# Patient Record
Sex: MALE | Race: WHITE | NOT HISPANIC OR LATINO | Employment: PART TIME | URBAN - METROPOLITAN AREA
[De-identification: names, ages, dates, MRNs, and addresses within clinical notes are randomized per-mention and may not be internally consistent; named-entity substitution may affect disease eponyms.]

---

## 2019-05-09 ENCOUNTER — HOSPITAL ENCOUNTER (EMERGENCY)
Facility: HOSPITAL | Age: 29
Discharge: HOME | End: 2019-05-09
Attending: EMERGENCY MEDICINE

## 2019-05-09 VITALS
HEART RATE: 89 BPM | TEMPERATURE: 98 F | DIASTOLIC BLOOD PRESSURE: 80 MMHG | WEIGHT: 170 LBS | HEIGHT: 69 IN | BODY MASS INDEX: 25.18 KG/M2 | OXYGEN SATURATION: 100 % | RESPIRATION RATE: 16 BRPM | SYSTOLIC BLOOD PRESSURE: 155 MMHG

## 2019-05-09 DIAGNOSIS — G51.0 BELL'S PALSY: Primary | ICD-10-CM

## 2019-05-09 PROCEDURE — 25000003 PHARM REV CODE 250: Performed by: EMERGENCY MEDICINE

## 2019-05-09 PROCEDURE — 99284 EMERGENCY DEPT VISIT MOD MDM: CPT | Mod: 25

## 2019-05-09 RX ORDER — BUTALBITAL, ACETAMINOPHEN AND CAFFEINE 50; 325; 40 MG/1; MG/1; MG/1
1 TABLET ORAL
Status: COMPLETED | OUTPATIENT
Start: 2019-05-09 | End: 2019-05-09

## 2019-05-09 RX ORDER — PREDNISONE 20 MG/1
20 TABLET ORAL 2 TIMES DAILY
Qty: 10 TABLET | Refills: 0 | Status: SHIPPED | OUTPATIENT
Start: 2019-05-09 | End: 2019-05-14

## 2019-05-09 RX ORDER — ACETAMINOPHEN 325 MG/1
325 TABLET ORAL
Status: COMPLETED | OUTPATIENT
Start: 2019-05-09 | End: 2019-05-09

## 2019-05-09 RX ORDER — BUTALBITAL, ACETAMINOPHEN AND CAFFEINE 50; 325; 40 MG/1; MG/1; MG/1
1 TABLET ORAL EVERY 4 HOURS PRN
Qty: 15 TABLET | Refills: 0 | Status: SHIPPED | OUTPATIENT
Start: 2019-05-09 | End: 2019-06-08

## 2019-05-09 RX ADMIN — ACETAMINOPHEN 325 MG: 325 TABLET ORAL at 08:05

## 2019-05-09 RX ADMIN — BUTALBITAL, ACETAMINOPHEN AND CAFFEINE 1 TABLET: 50; 325; 40 TABLET ORAL at 08:05

## 2019-05-09 NOTE — ED PROVIDER NOTES
"Encounter Date: 5/9/2019    SCRIBE #1 NOTE: I, Tana Romo, am scribing for, and in the presence of, Dr. Rickie Wakefield.       History     Chief Complaint   Patient presents with    Facial Droop     on L x 1 year.       Time seen by provider: 8:02 AM on 05/09/2019    Lamin Munoz is a 28 y.o. male who presents the ED with complaints of left facial droop and left ear pain. The patient notes that his symptoms started since he had his wisdom teeth removed x14 months ago. He started using ear drops for his ear aches a couple days ago but "it made the pain 15 times worse". He has not been able to sleep properly since then. The patient mentions having left sided headaches secondary to his ear pain. No PMHx noted. No SHx noted. NKDA noted.     The history is provided by the patient.     Review of patient's allergies indicates:  No Known Allergies  History reviewed. No pertinent past medical history.  History reviewed. No pertinent surgical history.  History reviewed. No pertinent family history.  Social History     Tobacco Use    Smoking status: Former Smoker   Substance Use Topics    Alcohol use: Not on file    Drug use: Not on file     Review of Systems   Constitutional: Negative for activity change, appetite change, chills, fatigue and fever.   HENT: Positive for ear pain (left). Negative for congestion and rhinorrhea.    Respiratory: Negative for shortness of breath.    Cardiovascular: Negative for chest pain.   Gastrointestinal: Negative for abdominal pain.   Musculoskeletal: Negative for neck pain.   Skin: Negative for pallor and rash.   Neurological: Positive for facial asymmetry (left side facial droop) and headaches (left sided). Negative for weakness.   Hematological: Does not bruise/bleed easily.   Psychiatric/Behavioral: Negative for agitation.       Physical Exam     Initial Vitals [05/09/19 0757]   BP Pulse Resp Temp SpO2   (!) 172/106 66 12 98.2 °F (36.8 °C) 100 %      MAP       --         Physical " Exam    Nursing note and vitals reviewed.  Constitutional: He appears well-developed and well-nourished.   HENT:   Head: Normocephalic and atraumatic.   Right Ear: A middle ear effusion is present.   Left Ear: A middle ear effusion is present.   Mouth/Throat: Uvula is midline, oropharynx is clear and moist and mucous membranes are normal.   7th cranial nerve palsy noted. No gingival swelling noted.   Eyes: Conjunctivae are normal.   Neck: Normal range of motion. Neck supple.   No cervical adenopathy.   Cardiovascular: Normal rate, regular rhythm and normal heart sounds. Exam reveals no gallop and no friction rub.    No murmur heard.  Pulmonary/Chest: Breath sounds normal. No respiratory distress. He has no wheezes. He has no rhonchi. He has no rales.   Musculoskeletal: Normal range of motion.   Lymphadenopathy:     He has no cervical adenopathy.   Neurological: He is alert and oriented to person, place, and time. He has normal strength. No cranial nerve deficit or sensory deficit.   5/5 strength and sensation to light touch intact to BLE and BUE.   Skin: Skin is warm and dry.   Psychiatric: He has a normal mood and affect.         ED Course   Procedures  Labs Reviewed - No data to display       Imaging Results    None          Medical Decision Making:   History:   Old Medical Records: I decided to obtain old medical records.  ED Management:  28-year-old male presents with a 14 month history of left cranial nerve 7 palsy with forehead involvement.  The symptoms are strongly consistent with Bell's palsy.  He does have a headache; however, intracranial mass is unlikely.  There is no evidence of infection.  He is given steroids for serous otic effusion       APC / Resident Notes:   I, Dr. Rickie Wakefield III, personally performed the services described in this documentation. All medical record entries made by the scribe were at my direction and in my presence.  I have reviewed the chart and agree that the record reflects  my personal performance and is accurate and complete       Scribe Attestation:   Scribe #1: I performed the above scribed service and the documentation accurately describes the services I performed. I attest to the accuracy of the note.               Clinical Impression:       ICD-10-CM ICD-9-CM   1. Bell's palsy G51.0 351.0         Disposition:   Disposition: Discharged  Condition: Stable                        Rickie Wakefield III, MD  05/09/19 2957